# Patient Record
Sex: MALE | Race: WHITE | NOT HISPANIC OR LATINO | Employment: FULL TIME | URBAN - METROPOLITAN AREA
[De-identification: names, ages, dates, MRNs, and addresses within clinical notes are randomized per-mention and may not be internally consistent; named-entity substitution may affect disease eponyms.]

---

## 2024-04-08 DIAGNOSIS — G45.9 TIA (TRANSIENT ISCHEMIC ATTACK): Primary | ICD-10-CM

## 2024-05-30 ENCOUNTER — OFFICE VISIT (OUTPATIENT)
Dept: NEUROLOGY | Facility: CLINIC | Age: 64
End: 2024-05-30
Payer: COMMERCIAL

## 2024-05-30 VITALS
HEIGHT: 70 IN | DIASTOLIC BLOOD PRESSURE: 90 MMHG | SYSTOLIC BLOOD PRESSURE: 156 MMHG | WEIGHT: 205 LBS | BODY MASS INDEX: 29.35 KG/M2 | HEART RATE: 59 BPM

## 2024-05-30 DIAGNOSIS — I82.5Z2 CHRONIC DEEP VEIN THROMBOSIS (DVT) OF DISTAL VEIN OF LEFT LOWER EXTREMITY: ICD-10-CM

## 2024-05-30 DIAGNOSIS — I10 PRIMARY HYPERTENSION: ICD-10-CM

## 2024-05-30 DIAGNOSIS — G47.30 SLEEP APNEA, UNSPECIFIED TYPE: ICD-10-CM

## 2024-05-30 DIAGNOSIS — D68.51 FACTOR V LEIDEN MUTATION: ICD-10-CM

## 2024-05-30 DIAGNOSIS — G45.9 TIA (TRANSIENT ISCHEMIC ATTACK): ICD-10-CM

## 2024-05-30 DIAGNOSIS — R56.9 PARTIAL SEIZURES WITH LOSS OF SPEECH: Primary | ICD-10-CM

## 2024-05-30 DIAGNOSIS — R47.01 PARTIAL SEIZURES WITH LOSS OF SPEECH: Primary | ICD-10-CM

## 2024-05-30 DIAGNOSIS — G45.9 TRANSIENT CEREBRAL ISCHEMIA, UNSPECIFIED TYPE: ICD-10-CM

## 2024-05-30 PROCEDURE — 3080F DIAST BP >= 90 MM HG: CPT | Mod: CPTII,S$GLB,, | Performed by: PSYCHIATRY & NEUROLOGY

## 2024-05-30 PROCEDURE — 3008F BODY MASS INDEX DOCD: CPT | Mod: CPTII,S$GLB,, | Performed by: PSYCHIATRY & NEUROLOGY

## 2024-05-30 PROCEDURE — 1159F MED LIST DOCD IN RCRD: CPT | Mod: CPTII,S$GLB,, | Performed by: PSYCHIATRY & NEUROLOGY

## 2024-05-30 PROCEDURE — 99999 PR PBB SHADOW E&M-EST. PATIENT-LVL III: CPT | Mod: PBBFAC,,, | Performed by: PSYCHIATRY & NEUROLOGY

## 2024-05-30 PROCEDURE — 99205 OFFICE O/P NEW HI 60 MIN: CPT | Mod: S$GLB,,, | Performed by: PSYCHIATRY & NEUROLOGY

## 2024-05-30 PROCEDURE — 4010F ACE/ARB THERAPY RXD/TAKEN: CPT | Mod: CPTII,S$GLB,, | Performed by: PSYCHIATRY & NEUROLOGY

## 2024-05-30 PROCEDURE — 3077F SYST BP >= 140 MM HG: CPT | Mod: CPTII,S$GLB,, | Performed by: PSYCHIATRY & NEUROLOGY

## 2024-05-30 RX ORDER — ASPIRIN 81 MG/1
81 TABLET ORAL DAILY
COMMUNITY

## 2024-05-30 RX ORDER — ENALAPRIL MALEATE 20 MG/1
20 TABLET ORAL DAILY
COMMUNITY

## 2024-05-30 RX ORDER — LEVETIRACETAM 500 MG/1
500 TABLET ORAL 2 TIMES DAILY
Qty: 180 TABLET | Refills: 5 | Status: SHIPPED | OUTPATIENT
Start: 2024-05-30

## 2024-05-30 RX ORDER — ROSUVASTATIN CALCIUM 40 MG/1
40 TABLET, COATED ORAL NIGHTLY
COMMUNITY

## 2024-05-30 NOTE — PROGRESS NOTES
Neurology Office Visit  Neurology  HPI:    Ho Cervantes is a 63 y.o. male who is referred by Isacc Rogers MD due to a recent TIA. He has a history of DVT, factor 5 Leiden mutation, hyperlipidemia, hypertension.  He was previously on warfarin due to left upper extremity venous thrombosis after a shoulder surgery but was switched to Eliquis since the DVT was found in his leg.  In March 20, 2024, he had an episode of word-finding difficulty and was admitted at [facility] in [place].  He would extensive workup including CTA, MRI with and without contrast.  He was discharged with a diagnosis of TIA and was started on aspirin along with his home regimen of Eliquis.  He is accompanied by his wife who is a nurse.      He describes the episode of TIA as difficulty getting words out.  He was at a [employer] when he experienced word finding difficulty. Subsequently had some trouble and confusion while in the parking lot and then drove to [facility] where he called his wife stating that there was something wrong with him. He was taken to [facility] in [place] and his symptoms resolved within an hour from onset. His initially MRI without contrast showed no acute infarct but concern for vasogenic edema and mass in the left parietal region. An MRI with contrast did not demonstrate a mass. His CTA and echo were unremarkable and was subsequently discharged on aspirin and eliquis after 3 days in the hospital. He denies any previous episodes of weakness, numbness, vision or speech changes, loss of consciousness. He does report head injury at work after he hit his head on the right frontal region and was bleeding. He remains compliant with blood thinners and denies any concerns for bleeding. He is a non smoker and has no family history of strokes/heart attack at young age. He sees Dr Lori Abdalla (cardiology). He also reports that he had a holter monitor test done 7-8 yrs ago and was reportedly normal. He does not remember the reason but said  that he established care with cardiology as many of his friends had heart attack at young age. 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     ROS:  Review of Systems   Constitutional:  Negative for malaise/fatigue.   Eyes:  Negative for blurred vision and double vision.   Cardiovascular:  Negative for chest pain and palpitations.   Gastrointestinal:  Negative for blood in stool.   Neurological:  Negative for dizziness, tingling, sensory change, speech change, focal weakness and headaches.   Endo/Heme/Allergies:  Does not bruise/bleed easily.        Past Medical History:   Diagnosis Date    Hypertension      History reviewed. No pertinent surgical history.  No family history on file.  Review of patient's allergies indicates:  No Known Allergies    Current Outpatient Medications:     apixaban (ELIQUIS) 5 mg Tab, Take 5 mg by mouth 2 (two) times daily., Disp: , Rfl:     aspirin (ECOTRIN) 81 MG EC tablet, Take 81 mg by mouth once daily., Disp: , Rfl:     enalapril (VASOTEC) 20 MG tablet, Take 20 mg by mouth once daily., Disp: , Rfl:     rosuvastatin (CRESTOR) 40 MG Tab, Take 40 mg by mouth every evening., Disp: , Rfl:   Outpatient Medications Marked as Taking for the 5/30/24 encounter (Office Visit) with Kunal Mandujano MD   Medication Sig Dispense Refill    apixaban (ELIQUIS) 5 mg Tab Take 5 mg by mouth 2 (two) times daily.      aspirin (ECOTRIN) 81 MG EC tablet Take 81 mg by mouth once daily.      enalapril (VASOTEC) 20 MG tablet Take 20 mg by mouth once daily.      rosuvastatin (CRESTOR) 40 MG Tab Take 40 mg by mouth every evening.       Social History     Tobacco Use    Smoking status: Never     Passive exposure: Never    Smokeless tobacco: Never   Substance Use Topics    Alcohol use: Yes     Comment: 1-2 times per month    Drug use: Never         Vitals:    05/30/24 0827   BP: (!) 156/90   Pulse: (!) 59       Physical Exam:  HEENT NC/AT  CV RRR, no  tenderness  Resp clear without dyspnea  GI soft  Ext no edema    Neuro:  Alert & oriented x 3  EOMI, PERRLA, visual field intact in all 4 quadrants, no nystagmus or diplopia on lateral gaze  Face symmetric, speech clear and fluent, facial sensation equal bilaterally  Tongue midline  Strength 5/5 in bilateral upper and lower extremities   Sensation intact and equal bilaterally  Gait steady and balanced     mRS 0    Imaging:  Reviewed MRI brain with and without contrast  Encephalomalacia in left parietal lobe without contrast enhancement. Concern for some vasogenic edema. No acute infarct was seen in March.     Assessment:   Ho Cervantes is a 63 y.o. male who is referred by Isacc Rogers MD due to a recent TIA. He has a history of DVT, factor 5 Leiden mutation, hyperlipidemia, hypertension.  He was previously on warfarin due to left upper extremity venous thrombosis after a shoulder surgery but was switched to Eliquis since the DVT was found in his leg.  In March 20, 2024, he had an episode of word-finding difficulty with altered awareness and was admitted at [facility] in [place].  He would extensive workup including CTA, MRI with and without contrast.  He was discharged with a diagnosis of TIA and was started on aspirin along with his home regimen of Eliquis.  He is accompanied by his wife who is a nurse.  His current examination is unremarkable.     I reviewed his imaging and explained to him that the differential diagnosis of his episode remain wide and includes TIA vs focal seizure with impaired awareness vs intracranial mass. He was found to have a left parietal encephalomalacia with no prior history of stroke or stroke symptoms. He was on eliquis at the time of his event and reports good compliance. His MRI w contrast was negative for a mass but did show some vasogenic edema in the parietal region. I explained the possible diagnoses to him and discussed the plan of care. I also advised him to not drive and operate  heavy machinery at which point he became very upset and wished to leave the office. I explained to his wife that there is no way I can rule out a seizure given the imaging findings and its the state law to not drive for 6 months from his last episode concerning for seizure. Eventually, patient calmed down and discussed the plan of care including starting antiepileptics and repeating imaging. As far as stroke prevention goes, he is on aspirin and eliquis and is medically optimized. I also requested a holter monitor with his cardiologist. He is already on eliquis due to Factor V Leiden mutation and history of DVT so the management will not change if he found to have A.Fib.     Plan:   - Continue eliquis 5 mg bid, aspirin 81mg daily for now  - start keppra 500 mg bid   - Routine EEG x 1 hr  - Outpatient sleep study  - repeat MRI brain with and without contrast in 1 month  - Holter monitor x 7 days  - No driving or operating heavy machinery for 6 months from last episode of loss of awareness   - follow up in clinic with Ade Denson NP in 3 months    Kunal Mandujano MD  Vascular and Interventional Neurology

## 2024-06-27 ENCOUNTER — PROCEDURE VISIT (OUTPATIENT)
Dept: SLEEP MEDICINE | Facility: HOSPITAL | Age: 64
End: 2024-06-27
Attending: PSYCHIATRY & NEUROLOGY
Payer: COMMERCIAL

## 2024-06-27 DIAGNOSIS — G47.33 OSA (OBSTRUCTIVE SLEEP APNEA): Primary | ICD-10-CM

## 2024-06-27 DIAGNOSIS — R47.01 PARTIAL SEIZURES WITH LOSS OF SPEECH: ICD-10-CM

## 2024-06-27 DIAGNOSIS — R56.9 PARTIAL SEIZURES WITH LOSS OF SPEECH: ICD-10-CM

## 2024-06-27 DIAGNOSIS — G47.30 SLEEP APNEA, UNSPECIFIED TYPE: ICD-10-CM

## 2024-06-27 PROCEDURE — G0399 HOME SLEEP TEST/TYPE 3 PORTA: HCPCS

## 2024-07-11 ENCOUNTER — HOSPITAL ENCOUNTER (OUTPATIENT)
Dept: RADIOLOGY | Facility: HOSPITAL | Age: 64
Discharge: HOME | End: 2024-07-11
Attending: PSYCHIATRY & NEUROLOGY
Payer: COMMERCIAL

## 2024-07-11 ENCOUNTER — HOSPITAL ENCOUNTER (OUTPATIENT)
Dept: CARDIOLOGY | Facility: HOSPITAL | Age: 64
Discharge: HOME | End: 2024-07-11
Attending: PSYCHIATRY & NEUROLOGY
Payer: COMMERCIAL

## 2024-07-11 DIAGNOSIS — G45.9 TIA (TRANSIENT ISCHEMIC ATTACK): ICD-10-CM

## 2024-07-11 DIAGNOSIS — G45.9 TRANSIENT CEREBRAL ISCHEMIA, UNSPECIFIED TYPE: ICD-10-CM

## 2024-07-11 PROCEDURE — 93242 EXT ECG>48HR<7D RECORDING: CPT

## 2024-07-11 PROCEDURE — 25500020 PHARM REV CODE 255: Performed by: PSYCHIATRY & NEUROLOGY

## 2024-07-11 PROCEDURE — 93243 EXT ECG>48HR<7D SCAN A/R: CPT

## 2024-07-11 PROCEDURE — 70553 MRI BRAIN STEM W/O & W/DYE: CPT | Mod: TC

## 2024-07-11 PROCEDURE — A9577 INJ MULTIHANCE: HCPCS | Performed by: PSYCHIATRY & NEUROLOGY

## 2024-07-11 RX ADMIN — GADOBENATE DIMEGLUMINE 20 ML: 529 INJECTION, SOLUTION INTRAVENOUS at 02:07

## 2024-07-15 ENCOUNTER — TELEPHONE (OUTPATIENT)
Dept: NEUROLOGY | Facility: CLINIC | Age: 64
End: 2024-07-15
Payer: COMMERCIAL

## 2024-07-15 NOTE — TELEPHONE ENCOUNTER
----- Message from Kunal Mandujano MD sent at 7/13/2024  4:33 PM CDT -----  Please notify patient that his MRI is negative for any tumor. Suspect it was a stroke that was seen on prior MRI. Continue current management and follow up with Ade as scheduled.

## 2024-08-16 ENCOUNTER — OFFICE VISIT (OUTPATIENT)
Dept: NEUROLOGY | Facility: CLINIC | Age: 64
End: 2024-08-16
Payer: COMMERCIAL

## 2024-08-16 VITALS
WEIGHT: 205 LBS | SYSTOLIC BLOOD PRESSURE: 176 MMHG | HEART RATE: 56 BPM | BODY MASS INDEX: 29.35 KG/M2 | DIASTOLIC BLOOD PRESSURE: 95 MMHG | HEIGHT: 70 IN

## 2024-08-16 DIAGNOSIS — R47.01 PARTIAL SEIZURES WITH LOSS OF SPEECH: ICD-10-CM

## 2024-08-16 DIAGNOSIS — R56.9 PARTIAL SEIZURES WITH LOSS OF SPEECH: ICD-10-CM

## 2024-08-16 DIAGNOSIS — G45.9 TIA (TRANSIENT ISCHEMIC ATTACK): ICD-10-CM

## 2024-08-16 DIAGNOSIS — Z86.73 H/O: STROKE: Primary | ICD-10-CM

## 2024-08-16 PROCEDURE — 99999 PR PBB SHADOW E&M-EST. PATIENT-LVL III: CPT | Mod: PBBFAC,,, | Performed by: NURSE PRACTITIONER

## 2024-08-16 NOTE — PROGRESS NOTES
Subjective:      Patient ID: Ho Cervantes is a 64 y.o. male.    Chief Complaint:  TIA/Seizures  (Patient denies any seizure like or TIA like symptoms )      History of Present Illness  Patient presents for follow up of TIA vs. Seizure. Patient has a history of DVT, factor 5 Leiden mutation, hyperlipidemia, hypertension. In March 2024, patient had an episode of word finding difficulty. He was admitted to [facility] with a stroke workup and was diagnosed with a TIA. Repeat MRI brain w/ w/out contrast showed previous left parietal infarct. He is taking ASA/eliquis/keppra as prescribed. Reports his total cholesterol level recently was 168. Today he denies any new onset of numbness, tingling or weakness. Denies any difficulty with speech. Denies any episodes of confusion. Tolerating medications well.       An office visit of an established patient, 64 years old. Prior to the patient's arrival on the same day, the provider spends 10 minutes reviewing the results of lab work, hospital stay and physician notes. Once in the exam room with the patient, the provider then spends 25 minutes in the room with the member performing a history and exam as well as reviewing the test results and recommendations with the patient. After leaving the exam room, the provider then spends an additional 5 minutes completing the electronic health record. The total time spent that day caring for the member is 40  minutes, and this time--including the breakdown--is documented in the medical record.      Past Medical History:   Diagnosis Date    Hypertension        History reviewed. No pertinent surgical history.    No family history on file.    Social History     Socioeconomic History    Marital status:    Tobacco Use    Smoking status: Never     Passive exposure: Never    Smokeless tobacco: Never   Substance and Sexual Activity    Alcohol use: Yes     Comment: 1-2 times per month    Drug use: Never    Sexual activity: Not Currently  "      Current Outpatient Medications   Medication Sig Dispense Refill    apixaban (ELIQUIS) 5 mg Tab Take 5 mg by mouth 2 (two) times daily.      aspirin (ECOTRIN) 81 MG EC tablet Take 81 mg by mouth once daily.      enalapril (VASOTEC) 20 MG tablet Take 20 mg by mouth once daily.      levETIRAcetam (KEPPRA) 500 MG Tab Take 1 tablet (500 mg total) by mouth 2 (two) times daily. 180 tablet 5    rosuvastatin (CRESTOR) 40 MG Tab Take 40 mg by mouth every evening.       No current facility-administered medications for this visit.       Review of patient's allergies indicates:  No Known Allergies       Vitals:    08/16/24 0917   BP: (!) 176/95   BP Location: Left arm   Patient Position: Sitting   Pulse: (!) 56   Weight: 93 kg (205 lb 0.4 oz)   Height: 5' 10" (1.778 m)      Review of Systems  12 point ROS performed unless otherwise documented in HPI  Objective:     Neurologic Exam     Mental Status   Oriented to person, place, and time.   Speech: speech is normal   Level of consciousness: alert    Cranial Nerves   Cranial nerves II through XII intact.     Motor Exam   Muscle bulk: normal    Strength   Strength 5/5 throughout.     Sensory Exam   Light touch normal.     Gait, Coordination, and Reflexes     Gait  Gait: normal      Physical Exam  Vitals reviewed.   Pulmonary:      Effort: Pulmonary effort is normal.   Neurological:      Mental Status: He is oriented to person, place, and time.      Cranial Nerves: Cranial nerves 2-12 are intact.      Motor: Motor strength is normal.     Gait: Gait is intact.   Psychiatric:         Speech: Speech normal.          Assessment:     1. H/O: stroke    2. TIA (transient ischemic attack)    3. Partial seizures with loss of speech        Plan:     150/80 at urology appointment earlier today  Continue Eliquis and ASA  Continue management of cholesterol per cardiology; goal LDL <70  Can cut Keppra to 250 mg BID; advised to call with any seizure activity          "

## 2024-09-02 PROBLEM — G45.9 TIA (TRANSIENT ISCHEMIC ATTACK): Status: RESOLVED | Noted: 2024-05-30 | Resolved: 2024-09-02

## 2024-12-13 ENCOUNTER — OFFICE VISIT (OUTPATIENT)
Dept: NEUROLOGY | Facility: CLINIC | Age: 64
End: 2024-12-13
Payer: COMMERCIAL

## 2024-12-13 VITALS
WEIGHT: 209 LBS | SYSTOLIC BLOOD PRESSURE: 178 MMHG | BODY MASS INDEX: 29.92 KG/M2 | HEIGHT: 70 IN | HEART RATE: 79 BPM | DIASTOLIC BLOOD PRESSURE: 91 MMHG

## 2024-12-13 DIAGNOSIS — Z86.73 H/O: STROKE: ICD-10-CM

## 2024-12-13 DIAGNOSIS — R56.9 PARTIAL SEIZURES WITH LOSS OF SPEECH: Primary | ICD-10-CM

## 2024-12-13 DIAGNOSIS — R47.01 PARTIAL SEIZURES WITH LOSS OF SPEECH: Primary | ICD-10-CM

## 2024-12-13 PROCEDURE — 99999 PR PBB SHADOW E&M-EST. PATIENT-LVL III: CPT | Mod: PBBFAC,,, | Performed by: NURSE PRACTITIONER

## 2024-12-13 RX ORDER — LEVETIRACETAM 750 MG/1
750 TABLET ORAL
COMMUNITY
Start: 2024-11-27 | End: 2024-12-13 | Stop reason: SDUPTHER

## 2024-12-13 RX ORDER — PYRIDOXINE HCL (VITAMIN B6) 25 MG
50 TABLET ORAL
COMMUNITY
Start: 2024-11-27 | End: 2024-12-13 | Stop reason: SDUPTHER

## 2024-12-13 RX ORDER — PYRIDOXINE HCL (VITAMIN B6) 25 MG
50 TABLET ORAL DAILY
Qty: 180 TABLET | Refills: 3 | Status: SHIPPED | OUTPATIENT
Start: 2024-12-13 | End: 2025-12-13

## 2024-12-13 RX ORDER — LEVETIRACETAM 750 MG/1
750 TABLET ORAL 2 TIMES DAILY
Qty: 180 TABLET | Refills: 3 | Status: SHIPPED | OUTPATIENT
Start: 2024-12-13 | End: 2025-12-13

## 2024-12-13 RX ORDER — KETOROLAC TROMETHAMINE 10 MG/1
10 TABLET, FILM COATED ORAL EVERY 6 HOURS PRN
COMMUNITY
Start: 2024-08-29

## 2024-12-13 RX ORDER — TIZANIDINE 2 MG/1
2 TABLET ORAL NIGHTLY PRN
COMMUNITY
Start: 2024-12-02

## 2024-12-13 NOTE — PROGRESS NOTES
Subjective:      Patient ID: Ho Cervantes is a 64 y.o. male.    Chief Complaint:  Transient Ischemic Attack (Seizure or TIA? Confusion, slurred speech. Imaging in chart/Completely d/c keppra in October 2024, had an episode. Do not know is it is a TIA or Seizure./restarted Keppra November 2024 and has been doing fine since)      History of Present Illness  Patient presents for follow up after hospitalization in November of this year. Patient had recently discontinued Keppra when he had an event at work on November 26. Patient reports that he could not talk but he could understand what people were saying to him. He states he had been off of keppra for maybe a week and a half. He was brought to [facility] where MRI brain/CT head were performed. Both personally reviewed. No new stroke on imaging. Patient was restarted on Keppra and discharged home. Since November 26, he has not had any further events. He is tolerating keppra well with no changes in mood or fatigue. He is interested in returning to work but he is a heavy  at the time.     An office visit of an established patient, 64 years old. Prior to the patient's arrival on the same day, the provider spends 20 minutes reviewing the results of lab work, hospital stay and physician notes. Once in the exam room with the patient, the provider then spends 35 minutes in the room with the member performing a history and exam as well as reviewing the test results and recommendations with the patient. After leaving the exam room, the provider then spends an additional 5 minutes completing the electronic health record. The total time spent that day caring for the member is 60 minutes, and this time--including the breakdown--is documented in the medical record.            Past Medical History:   Diagnosis Date    Hypertension        History reviewed. No pertinent surgical history.    No family history on file.    Social History     Socioeconomic History     Marital status:    Tobacco Use    Smoking status: Never     Passive exposure: Never    Smokeless tobacco: Never   Substance and Sexual Activity    Alcohol use: Yes     Comment: 1-2 times per month    Drug use: Never    Sexual activity: Not Currently       Current Outpatient Medications   Medication Sig Dispense Refill    apixaban (ELIQUIS) 5 mg Tab Take 5 mg by mouth 2 (two) times daily.      aspirin (ECOTRIN) 81 MG EC tablet Take 81 mg by mouth once daily.      enalapril (VASOTEC) 20 MG tablet Take 20 mg by mouth once daily.      ketorolac (TORADOL) 10 mg tablet Take 10 mg by mouth every 6 (six) hours as needed.      levETIRAcetam (KEPPRA) 750 MG Tab Take 750 mg by mouth.      pyridoxine, vitamin B6, (B-6) 25 MG Tab Take 50 mg by mouth.      tiZANidine (ZANAFLEX) 2 MG tablet Take 2 mg by mouth nightly as needed.       No current facility-administered medications for this visit.       Review of patient's allergies indicates:  No Known Allergies     Review of Systems  12 point ROS performed and negative unless otherwise documented in HPI  Objective:   Neurologic Exam      Mental Status   Oriented to person, place, and time.   Speech: speech is normal   Level of consciousness: alert     Cranial Nerves   Cranial nerves II through XII intact.      Motor Exam   Muscle bulk: normal     Strength   Strength 5/5 throughout.      Sensory Exam   Light touch normal.      Gait, Coordination, and Reflexes      Gait  Gait: normal        Physical Exam  Vitals reviewed.   Pulmonary:      Effort: Pulmonary effort is normal.   Neurological:      Mental Status: He is oriented to person, place, and time.      Cranial Nerves: Cranial nerves 2-12 are intact.      Motor: Motor strength is normal.     Gait: Gait is intact.   Psychiatric:         Speech: Speech normal.        Assessment:     1. Partial seizures with loss of speech    2. H/O: stroke        Plan:     Patient will need to continue lifelong seizure medications. He is  tolerating Keppra 750 BID with no side effects  Discussed with patient and his wife that more likely had a seizure rather than a TIA due to the close proximity of discontinuing seizure medication vs. His compliance with his Eliquis.  Educated on stroke vs. TIA  Discussed with patient that due to the nature of this speech arrest/seizure event, I will re-evaluate him in clinic in Februrary and most likely release him to return to work after 3 months if no seizure recurrence.  Will fill out short term disability paperwork for his job

## 2024-12-16 ENCOUNTER — TELEPHONE (OUTPATIENT)
Dept: NEUROLOGY | Facility: CLINIC | Age: 64
End: 2024-12-16
Payer: COMMERCIAL

## 2024-12-16 NOTE — TELEPHONE ENCOUNTER
Pt called requesting the medical department at his employment to be call to explain the limitations, if any, while on Keppra. States he is a  and he assumes they will not allow him to go back to work when he is released in February.  Please advise.    Donnell Khan: [phone]  [employer]: employee # [id number]

## 2025-02-21 ENCOUNTER — OFFICE VISIT (OUTPATIENT)
Dept: NEUROLOGY | Facility: CLINIC | Age: 65
End: 2025-02-21
Payer: COMMERCIAL

## 2025-02-21 VITALS
BODY MASS INDEX: 30.06 KG/M2 | DIASTOLIC BLOOD PRESSURE: 94 MMHG | HEIGHT: 70 IN | WEIGHT: 210 LBS | HEART RATE: 71 BPM | SYSTOLIC BLOOD PRESSURE: 162 MMHG

## 2025-02-21 DIAGNOSIS — Z86.73 H/O: STROKE: ICD-10-CM

## 2025-02-21 DIAGNOSIS — R26.89 BALANCE PROBLEM: ICD-10-CM

## 2025-02-21 DIAGNOSIS — D68.51 FACTOR V LEIDEN MUTATION: ICD-10-CM

## 2025-02-21 DIAGNOSIS — R56.9 PARTIAL SEIZURES WITH LOSS OF SPEECH: Primary | ICD-10-CM

## 2025-02-21 DIAGNOSIS — R47.01 PARTIAL SEIZURES WITH LOSS OF SPEECH: Primary | ICD-10-CM

## 2025-02-21 RX ORDER — CLONIDINE HYDROCHLORIDE 0.1 MG/1
0.1 TABLET ORAL DAILY PRN
COMMUNITY

## 2025-02-21 RX ORDER — UBIDECARENONE 30 MG
30 CAPSULE ORAL
COMMUNITY

## 2025-02-21 NOTE — PROGRESS NOTES
Subjective:      Patient ID: Ho Cervantes is a 64 y.o. male.    Chief Complaint:  TIA/Seizures (Pt states unsteady, clumsy gait. Wakes up tired and sore. Short-term Memory is declining. Recent fall 02/20/25 and hurt his left arm. Classified Cat5 blood clotting disorder)      History of Present Illness  Patient presents for follow up of seizures and TIA.  Patient has a history of DVT, factor 5 Leiden mutation, hyperlipidemia, hypertension and left parietal stroke. He has a seizure history which will require lifelong treatment. He reports that he is unsteady and clumsy. He states he has been waking up feeling tired and sore. He had a fall yesterday and hurt his left arm. He reports he has had a few more instances of balance problems. Feels as if he just trips over twigs in his yard. He states his short term memory has worsened. His wife reports patient has difficulty with word finding. He denies any further seizure events.       An office visit of an established patient, 64 years old. Prior to the patient's arrival on the same day, the provider spends 10  minutes reviewing the results of lab work, hospital stay and physician notes. Once in the exam room with the patient, the provider then spends 30 minutes in the room with the member performing a history and exam as well as reviewing the test results and recommendations with the patient. After leaving the exam room, the provider then spends an additional 10 minutes completing the electronic health record. The total time spent that day caring for the member is 50 minutes, and this time--including the breakdown--is documented in the medical record.               Past Medical History:   Diagnosis Date    Blood clotting disorder     Hypertension        History reviewed. No pertinent surgical history.    No family history on file.    Social History     Socioeconomic History    Marital status:    Tobacco Use    Smoking status: Never     Passive exposure: Never     "Smokeless tobacco: Never   Substance and Sexual Activity    Alcohol use: Yes     Comment: 1-2 times per month    Drug use: Never    Sexual activity: Not Currently       Current Medications[1]    Review of patient's allergies indicates:  No Known Allergies     Vitals:    02/21/25 0837   BP: (!) 162/94   BP Location: Right arm   Patient Position: Sitting   Pulse: 71   Weight: 95.3 kg (210 lb)   Height: 5' 10" (1.778 m)      Review of Systems  12 point ROS performed and negative unless otherwise documented in HPI  Objective:       Neurologic Exam      Mental Status   Oriented to person, place, and time.   Speech: speech is normal   Level of consciousness: alert     Cranial Nerves   Cranial nerves II through XII intact.      Motor Exam   Muscle bulk: normal     Strength   Strength 5/5 throughout.      Sensory Exam   Light touch normal.      Gait, Coordination, and Reflexes      Gait  Gait: normal        Physical Exam  Vitals reviewed.   Pulmonary:      Effort: Pulmonary effort is normal.   Neurological:      Mental Status: He is oriented to person, place, and time.      Cranial Nerves: Cranial nerves 2-12 are intact.      Motor: Motor strength is normal.     Gait: Gait is intact.   Psychiatric:         Speech: Speech normal.      Assessment:     1. Partial seizures with loss of speech    2. H/O: stroke    3. Factor V Leiden mutation    4. Balance problem         Plan:   Continue Eliquis fo h/o DVT/factor V leiden disorder  Patient cannot return to work as a .  Continue Keppra 750 mg BID; can consider change to Briviact  Will consider PT for balance at next office visit  Long term disability form due on March 6. Will fill out for patient as he will not be able to return to work.           [1]   Current Outpatient Medications   Medication Sig Dispense Refill    apixaban (ELIQUIS) 5 mg Tab Take 5 mg by mouth 2 (two) times daily.      aspirin (ECOTRIN) 81 MG EC tablet Take 81 mg by mouth once daily.  "     enalapril (VASOTEC) 20 MG tablet Take 20 mg by mouth once daily.      ketorolac (TORADOL) 10 mg tablet Take 10 mg by mouth every 6 (six) hours as needed.      levETIRAcetam (KEPPRA) 750 MG Tab Take 1 tablet (750 mg total) by mouth 2 (two) times daily. 180 tablet 3    pyridoxine, vitamin B6, (B-6) 25 MG Tab Take 2 tablets (50 mg total) by mouth once daily. 180 tablet 3    tiZANidine (ZANAFLEX) 2 MG tablet Take 2 mg by mouth nightly as needed.      cloNIDine (CATAPRES) 0.1 MG tablet Take 0.1 mg by mouth daily as needed.      co-enzyme Q-10 30 mg capsule Take 30 mg by mouth.       No current facility-administered medications for this visit.

## 2025-05-27 ENCOUNTER — OFFICE VISIT (OUTPATIENT)
Dept: NEUROLOGY | Facility: CLINIC | Age: 65
End: 2025-05-27
Payer: COMMERCIAL

## 2025-05-27 VITALS
BODY MASS INDEX: 30.08 KG/M2 | SYSTOLIC BLOOD PRESSURE: 157 MMHG | HEIGHT: 70 IN | DIASTOLIC BLOOD PRESSURE: 87 MMHG | WEIGHT: 210.13 LBS | HEART RATE: 60 BPM

## 2025-05-27 DIAGNOSIS — G45.9 TRANSIENT ISCHEMIC ATTACK (TIA): ICD-10-CM

## 2025-05-27 DIAGNOSIS — Z86.73 H/O: STROKE: ICD-10-CM

## 2025-05-27 DIAGNOSIS — R56.9 SEIZURES: Primary | ICD-10-CM

## 2025-05-27 PROCEDURE — 1160F RVW MEDS BY RX/DR IN RCRD: CPT | Mod: CPTII,S$GLB,, | Performed by: NURSE PRACTITIONER

## 2025-05-27 PROCEDURE — 4010F ACE/ARB THERAPY RXD/TAKEN: CPT | Mod: CPTII,S$GLB,, | Performed by: NURSE PRACTITIONER

## 2025-05-27 PROCEDURE — 99213 OFFICE O/P EST LOW 20 MIN: CPT | Mod: S$GLB,,, | Performed by: NURSE PRACTITIONER

## 2025-05-27 PROCEDURE — 3079F DIAST BP 80-89 MM HG: CPT | Mod: CPTII,S$GLB,, | Performed by: NURSE PRACTITIONER

## 2025-05-27 PROCEDURE — 3008F BODY MASS INDEX DOCD: CPT | Mod: CPTII,S$GLB,, | Performed by: NURSE PRACTITIONER

## 2025-05-27 PROCEDURE — 3077F SYST BP >= 140 MM HG: CPT | Mod: CPTII,S$GLB,, | Performed by: NURSE PRACTITIONER

## 2025-05-27 PROCEDURE — 1159F MED LIST DOCD IN RCRD: CPT | Mod: CPTII,S$GLB,, | Performed by: NURSE PRACTITIONER

## 2025-05-27 PROCEDURE — 99999 PR PBB SHADOW E&M-EST. PATIENT-LVL III: CPT | Mod: PBBFAC,,, | Performed by: NURSE PRACTITIONER

## 2025-05-27 RX ORDER — LEVETIRACETAM 750 MG/1
750 TABLET ORAL 2 TIMES DAILY
Qty: 180 TABLET | Refills: 3 | Status: SHIPPED | OUTPATIENT
Start: 2025-05-27 | End: 2026-05-27

## 2025-05-27 NOTE — PROGRESS NOTES
"Subjective:      Patient ID: Ho Cervantes is a 64 y.o. male.    Chief Complaint:  Follow-up (3 month follow up DX: TIA/ seizures. Patient has episodes of being lightheaded and his balance is off. Pt reports a couple of falls since last visit. Denies any headaches, weakness, or blurred vision. )      History of Present Illness  Patient presents for follow up of seizures and TIA.  Patient has a history of DVT, factor 5 Leiden mutation, hyperlipidemia, hypertension and left parietal stroke. He has a seizure history which will require lifelong treatment.   He denies any new onset of numbness, tingling or weakness. Denies any difficulty with speech. Reports one morning he had left early for town and did not take his Keppra. He had about a 30 second episode of forgetting where he was but he returned home shortly after and took his medication. He does report he becomes unbalanced at times. He has had a couple of falls due to imbalance. Reports dizziness upon standing at times.           Past Medical History:   Diagnosis Date    Blood clotting disorder     Hypertension        No past surgical history on file.    No family history on file.    Social History     Socioeconomic History    Marital status:    Tobacco Use    Smoking status: Never     Passive exposure: Never    Smokeless tobacco: Never   Substance and Sexual Activity    Alcohol use: Yes     Comment: 1-2 times per month    Drug use: Never    Sexual activity: Not Currently       Current Medications[1]    Review of patient's allergies indicates:  No Known Allergies     Vitals:    05/27/25 0802 05/27/25 0846   BP: (!) 162/93 (!) 157/87   BP Location: Left arm Right arm   Patient Position: Sitting Sitting   Pulse: 60    Weight: 95.3 kg (210 lb 1.6 oz)    Height: 5' 10" (1.778 m)         Review of Systems  12 point ROS performed and negative unless otherwise documented in HPI  Objective:     Neurologic Exam      Mental Status   Oriented to person, place, and " time.   Speech: speech is normal   Level of consciousness: alert     Cranial Nerves   Cranial nerves II through XII intact.      Motor Exam   Muscle bulk: normal     Strength   Strength 5/5 throughout.      Sensory Exam   Light touch normal.      Gait, Coordination, and Reflexes      Gait  Gait: normal        Physical Exam  Vitals reviewed.   Pulmonary:      Effort: Pulmonary effort is normal.   Neurological:      Mental Status: He is oriented to person, place, and time.      Cranial Nerves: Cranial nerves 2-12 are intact.      Motor: Motor strength is normal.     Gait: Gait is intact.   Psychiatric:         Speech: Speech normal.     Assessment:     1. Seizures    2. Transient ischemic attack (TIA)    3. H/O: stroke        Plan:     Continue Eliquis fo h/o DVT/factor V leiden disorder  Patient cannot return to work as a .  Continue Keppra 750 mg BID; can consider change to Briviact  Patient currently not interested in PT for balance, advised to get up slowly to prevent orthostasis. Advised this is probably a residual stroke symptom.  Advised to continue monitoring BP at home and report sustained elevated BP to PCP.  Based on AHA/ACC 2021 guidelines, patient's primary care doctor should target BP goal of <130/80 mm?Hg, LDL-C <70 mg/dL and HbA1c of <7% to minimize the risk of future strokes.                 [1]  Current Outpatient Medications   Medication Sig Dispense Refill    apixaban (ELIQUIS) 5 mg Tab Take 5 mg by mouth 2 (two) times daily.      aspirin (ECOTRIN) 81 MG EC tablet Take 81 mg by mouth once daily.      cloNIDine (CATAPRES) 0.1 MG tablet Take 0.1 mg by mouth daily as needed.      co-enzyme Q-10 30 mg capsule Take 30 mg by mouth.      enalapril (VASOTEC) 20 MG tablet Take 20 mg by mouth once daily.      ketorolac (TORADOL) 10 mg tablet Take 10 mg by mouth every 6 (six) hours as needed.      pyridoxine, vitamin B6, (B-6) 25 MG Tab Take 2 tablets (50 mg total) by mouth once  daily. 180 tablet 3    tiZANidine (ZANAFLEX) 2 MG tablet Take 2 mg by mouth nightly as needed.      levETIRAcetam (KEPPRA) 750 MG Tab Take 1 tablet (750 mg total) by mouth 2 (two) times daily. 180 tablet 3     No current facility-administered medications for this visit.

## 2025-09-05 ENCOUNTER — OFFICE VISIT (OUTPATIENT)
Dept: NEUROLOGY | Facility: CLINIC | Age: 65
End: 2025-09-05
Payer: COMMERCIAL

## 2025-09-05 VITALS
HEART RATE: 55 BPM | SYSTOLIC BLOOD PRESSURE: 143 MMHG | BODY MASS INDEX: 30.08 KG/M2 | WEIGHT: 210.13 LBS | HEIGHT: 70 IN | DIASTOLIC BLOOD PRESSURE: 78 MMHG | OXYGEN SATURATION: 97 %

## 2025-09-05 DIAGNOSIS — Z86.73 H/O: STROKE: ICD-10-CM

## 2025-09-05 DIAGNOSIS — R56.9 SEIZURES: Primary | ICD-10-CM

## 2025-09-05 PROCEDURE — 99999 PR PBB SHADOW E&M-EST. PATIENT-LVL III: CPT | Mod: PBBFAC,,, | Performed by: NURSE PRACTITIONER
